# Patient Record
(demographics unavailable — no encounter records)

---

## 2025-02-20 NOTE — HISTORY OF PRESENT ILLNESS
[de-identified] : pain throat left side and left ear throat pain is bilateral past 3 mo no hearing loss, no ear plugging no dysphagia but intermittent pain intermittent gerd no rx neg tobacco

## 2025-02-20 NOTE — ASSESSMENT
[FreeTextEntry1] : exam ears and throat unremarkable laryngeal signs reflux trial pantoprazole 40 q d f/u 6 weeks

## 2025-02-20 NOTE — REASON FOR VISIT
[Initial Consultation] : an initial consultation for [Ear Pain] : ear pain [Throat Pain] : throat pain [Other: ______] : provided by LILLIANA [Family Member] : family member [FreeTextEntry2] : throat [Interpreters_IDNumber] : [id number] [Interpreters_FullName] : roxana [[id number]] : [place]

## 2025-02-20 NOTE — PHYSICAL EXAM
[Midline] : trachea located in midline position [Laryngoscopy Performed] : laryngoscopy was performed, see procedure section for findings [Normal] : no rashes [de-identified] : sl tender rt tmj [de-identified] : 1-2 plus wnl

## 2025-02-20 NOTE — PROCEDURE
[de-identified] : Indication for procedure:Unable to examine laryngeal structures with mirror exam.  Patient with excessive mucous in throat and fullness. The patient has chronic throat pain  scope # 99 Topical anesthesia is established with viscous xylocaine 2%. A flexible fiberoptic laryngoscope is introduced through the nares. No masses or inflammation is noted in the nasopharynx. The tongue base and valleculae are clear. The posterior pharyngeal wall is negative.  The hypopharynx is unobstructed. The supraglottic larynx is unremarkable. Both vocal cords are fully mobile without any polyp or nodule seen.  The vocal cords have no diffuse edema. There is moderate edema overlying the arytenoid cartilages and inter-arytenoid space. Mild erythema is noted the posterior larynx. The subglottic space is clear. The voice has a  normal quality.

## 2025-02-20 NOTE — REVIEW OF SYSTEMS
[Ear Pain] : ear pain [Hoarseness] : hoarseness [Throat Pain] : throat pain [Throat Dryness] : throat dryness [Patient Intake Form Reviewed] : Patient intake form was reviewed [Negative] : Mouth and Throat [Ear Itch] : no ear itch [Hearing Loss] : no hearing loss [Ear Drainage] : no ear drainage [Ear Noises] : no ear noises [Throat Clearing] : no throat clearing [Throat Itching] : no throat itching [de-identified] : left ear pain [de-identified] : swollen/ inflammation

## 2025-05-22 NOTE — REVIEW OF SYSTEMS
[Patient Intake Form Reviewed] : Patient intake form was reviewed [Negative] : Mouth and Throat [de-identified] : pt states no pain currently, pt feeling better

## 2025-05-22 NOTE — HISTORY OF PRESENT ILLNESS
[de-identified] : f/u throat pain x several months last visit w laryngeal signs reflux had trial pantoprazole 40 resolved throat pain and dryness

## 2025-06-27 NOTE — PHYSICAL EXAM
[Alert] : alert [Normal Voice/Communication] : normal voice/communication [Healthy Appearing] : healthy appearing [No Acute Distress] : no acute distress [Sclera] : the sclera and conjunctiva were normal [Hearing Threshold Finger Rub Not [place]] : hearing was normal [Normal Lips/Gums] : the lips and gums were normal [Oropharynx] : the oropharynx was normal [Normal Appearance] : the appearance of the neck was normal [No Neck Mass] : no neck mass was observed [No Respiratory Distress] : no respiratory distress [No Acc Muscle Use] : no accessory muscle use [Respiration, Rhythm And Depth] : normal respiratory rhythm and effort [Auscultation Breath Sounds / Voice Sounds] : lungs were clear to auscultation bilaterally [Heart Rate And Rhythm] : heart rate was normal and rhythm regular [Normal S1, S2] : normal S1 and S2 [Murmurs] : no murmurs [Bowel Sounds] : normal bowel sounds [Abdomen Tenderness] : non-tender [No Masses] : no abdominal mass palpated [Abdomen Soft] : soft [] : no hepatosplenomegaly [Internal Hemorrhoid] : internal hemorrhoid was present [Tender, Swollen] : nontender, non-swollen [Normal] : was normal [Occult Blood Positive] : was negative for occult blood [Stool Sample Taken] : a stool sample was obtained [Oriented To Time, Place, And Person] : oriented to person, place, and time

## 2025-06-27 NOTE — ASSESSMENT
[FreeTextEntry1] : 41 year old female with complaints of rectal irritation and itching for about 6 months. Usually occurs after passing a bowel movement. Denies constipation or straining. Digital rectal exam revealed small internal hemorrhoids. Negative for occult blood. No external hemorrhoids, tags, or irritation noted.   Start Proctosol 2.5% rectal cream BID PRN for rectal irritation High fiber diet Adequate fluid intake encouraged Sitz baths PRN  May consider colorectal evaluation if discomfort continues
